# Patient Record
Sex: FEMALE | Race: OTHER | Employment: FULL TIME | URBAN - NONMETROPOLITAN AREA
[De-identification: names, ages, dates, MRNs, and addresses within clinical notes are randomized per-mention and may not be internally consistent; named-entity substitution may affect disease eponyms.]

---

## 2020-10-27 ENCOUNTER — OFFICE VISIT (OUTPATIENT)
Dept: INTERNAL MEDICINE | Age: 20
End: 2020-10-27
Payer: COMMERCIAL

## 2020-10-27 VITALS
HEIGHT: 62 IN | HEART RATE: 78 BPM | DIASTOLIC BLOOD PRESSURE: 70 MMHG | OXYGEN SATURATION: 97 % | BODY MASS INDEX: 26.83 KG/M2 | SYSTOLIC BLOOD PRESSURE: 102 MMHG | WEIGHT: 145.8 LBS

## 2020-10-27 DIAGNOSIS — Z13.29 SCREENING FOR THYROID DISORDER: ICD-10-CM

## 2020-10-27 DIAGNOSIS — Z00.00 ROUTINE PHYSICAL EXAMINATION: ICD-10-CM

## 2020-10-27 DIAGNOSIS — Z13.220 SCREENING FOR LIPID DISORDERS: ICD-10-CM

## 2020-10-27 DIAGNOSIS — F41.9 ANXIETY: ICD-10-CM

## 2020-10-27 DIAGNOSIS — F33.1 MODERATE EPISODE OF RECURRENT MAJOR DEPRESSIVE DISORDER (HCC): ICD-10-CM

## 2020-10-27 PROBLEM — F32.9 MAJOR DEPRESSIVE DISORDER: Status: ACTIVE | Noted: 2020-10-27

## 2020-10-27 PROBLEM — M41.9 SCOLIOSIS: Status: ACTIVE | Noted: 2020-10-27

## 2020-10-27 LAB
ALBUMIN SERPL-MCNC: 4.6 G/DL (ref 3.5–5.2)
ALP BLD-CCNC: 81 U/L (ref 35–104)
ALT SERPL-CCNC: 57 U/L (ref 5–33)
ANION GAP SERPL CALCULATED.3IONS-SCNC: 13 MMOL/L (ref 7–19)
AST SERPL-CCNC: 88 U/L (ref 5–32)
BASOPHILS ABSOLUTE: 0 K/UL (ref 0–0.2)
BASOPHILS RELATIVE PERCENT: 0.8 % (ref 0–1)
BILIRUB SERPL-MCNC: 0.3 MG/DL (ref 0.2–1.2)
BUN BLDV-MCNC: 10 MG/DL (ref 6–20)
CALCIUM SERPL-MCNC: 9.4 MG/DL (ref 8.6–10)
CHLORIDE BLD-SCNC: 102 MMOL/L (ref 98–111)
CHOLESTEROL, TOTAL: 176 MG/DL (ref 160–199)
CO2: 25 MMOL/L (ref 22–29)
CREAT SERPL-MCNC: 0.5 MG/DL (ref 0.5–0.9)
EOSINOPHILS ABSOLUTE: 0.1 K/UL (ref 0–0.6)
EOSINOPHILS RELATIVE PERCENT: 2.1 % (ref 0–5)
GFR AFRICAN AMERICAN: >59
GFR NON-AFRICAN AMERICAN: >60
GLUCOSE BLD-MCNC: 91 MG/DL (ref 74–109)
HCT VFR BLD CALC: 42.5 % (ref 37–47)
HDLC SERPL-MCNC: 58 MG/DL (ref 65–121)
HEMOGLOBIN: 14.1 G/DL (ref 12–16)
IMMATURE GRANULOCYTES #: 0 K/UL
LDL CHOLESTEROL CALCULATED: 106 MG/DL
LYMPHOCYTES ABSOLUTE: 1.8 K/UL (ref 1.1–4.5)
LYMPHOCYTES RELATIVE PERCENT: 36.5 % (ref 20–40)
MCH RBC QN AUTO: 29.5 PG (ref 27–31)
MCHC RBC AUTO-ENTMCNC: 33.2 G/DL (ref 33–37)
MCV RBC AUTO: 88.9 FL (ref 81–99)
MONOCYTES ABSOLUTE: 0.3 K/UL (ref 0–0.9)
MONOCYTES RELATIVE PERCENT: 5.8 % (ref 0–10)
NEUTROPHILS ABSOLUTE: 2.7 K/UL (ref 1.5–7.5)
NEUTROPHILS RELATIVE PERCENT: 54.6 % (ref 50–65)
PDW BLD-RTO: 12.8 % (ref 11.5–14.5)
PLATELET # BLD: 265 K/UL (ref 130–400)
PMV BLD AUTO: 12.3 FL (ref 9.4–12.3)
POTASSIUM SERPL-SCNC: 4.1 MMOL/L (ref 3.5–5)
RBC # BLD: 4.78 M/UL (ref 4.2–5.4)
SODIUM BLD-SCNC: 140 MMOL/L (ref 136–145)
TOTAL PROTEIN: 7.2 G/DL (ref 6.6–8.7)
TRIGL SERPL-MCNC: 62 MG/DL (ref 0–149)
TSH SERPL DL<=0.05 MIU/L-ACNC: 0.94 UIU/ML (ref 0.27–4.2)
VITAMIN D 25-HYDROXY: 19.7 NG/ML
WBC # BLD: 4.9 K/UL (ref 4.8–10.8)

## 2020-10-27 PROCEDURE — 90471 IMMUNIZATION ADMIN: CPT | Performed by: INTERNAL MEDICINE

## 2020-10-27 PROCEDURE — 90694 VACC AIIV4 NO PRSRV 0.5ML IM: CPT | Performed by: INTERNAL MEDICINE

## 2020-10-27 PROCEDURE — 99203 OFFICE O/P NEW LOW 30 MIN: CPT | Performed by: INTERNAL MEDICINE

## 2020-10-27 RX ORDER — ESCITALOPRAM OXALATE 20 MG/1
20 TABLET ORAL DAILY
COMMUNITY
End: 2020-11-17

## 2020-10-27 SDOH — HEALTH STABILITY: MENTAL HEALTH: HOW OFTEN DO YOU HAVE A DRINK CONTAINING ALCOHOL?: MONTHLY OR LESS

## 2020-10-27 SDOH — HEALTH STABILITY: MENTAL HEALTH: HOW MANY STANDARD DRINKS CONTAINING ALCOHOL DO YOU HAVE ON A TYPICAL DAY?: 1 OR 2

## 2020-10-27 ASSESSMENT — PATIENT HEALTH QUESTIONNAIRE - PHQ9
2. FEELING DOWN, DEPRESSED OR HOPELESS: 1
1. LITTLE INTEREST OR PLEASURE IN DOING THINGS: 1
SUM OF ALL RESPONSES TO PHQ QUESTIONS 1-9: 2
SUM OF ALL RESPONSES TO PHQ9 QUESTIONS 1 & 2: 2

## 2020-10-27 ASSESSMENT — ENCOUNTER SYMPTOMS
RHINORRHEA: 0
VOMITING: 0
SINUS PAIN: 0
SHORTNESS OF BREATH: 0
WHEEZING: 0
CHEST TIGHTNESS: 0
BACK PAIN: 0
ABDOMINAL PAIN: 0
CONSTIPATION: 0
COUGH: 0
BLOOD IN STOOL: 0
DIARRHEA: 0
TROUBLE SWALLOWING: 0

## 2020-10-27 NOTE — PROGRESS NOTES
[facility] INTERNAL MEDICINE  [address] [address]  Phone: [phone]  Fax: [phone]    Visit Date:  10/27/2020    SUBJECTIVE:     Demian Valladares is a 23 y.o. female presents today in the office for:    Chief Complaint   Patient presents with   [address]       HPI  77-year-old female new patient visit  She has a history of anxiety and depression which was noted when she was in high school has been pretty dependent on Lexapro since that time she was previously on 8 and recently increased by her primary care physician to 20 mg  She is on Depo preparation for OCP and was expecting that today was told by the call center  She has been tested for HIV which is negative she has no high risk sexual behavior  She has no strong family history she is concerned about breast cancer  She drinks a lot of coffee she works for [employer] as a [employer]      Past Medical History:   Diagnosis Date    Anxiety     Depression        Past Surgical History:   Procedure Laterality Date    APPENDECTOMY         Social History     Socioeconomic History    Marital status: Single     Spouse name: Not on file    Number of children: Not on file    Years of education: Not on file    Highest education level: Not on file   Occupational History    Not on file   Social Needs    Financial resource strain: Not on file    Food insecurity     Worry: Not on file     Inability: Not on file    Transportation needs     Medical: Not on file     Non-medical: Not on file   Tobacco Use    Smoking status: Never Smoker    Smokeless tobacco: Never Used   Substance and Sexual Activity    Alcohol use:  Yes     Alcohol/week: 1.0 standard drinks     Types: 1 Cans of beer per week     Frequency: Monthly or less     Drinks per session: 1 or 2    Drug use: Never    Sexual activity: Not on file   Lifestyle    Physical activity     Days per week: Not on file     Minutes per session: Not on file    Stress: Not on file   Relationships    Social connections     Talks on phone: Not on file     Gets together: Not on file     Attends Spiritism service: Not on file     Active member of club or organization: Not on file     Attends meetings of clubs or organizations: Not on file     Relationship status: Not on file    Intimate partner violence     Fear of current or ex partner: Not on file     Emotionally abused: Not on file     Physically abused: Not on file     Forced sexual activity: Not on file   Other Topics Concern    Not on file   Social History Narrative    Not on file       No family history on file. No Known Allergies    Current Outpatient Medications   Medication Sig Dispense Refill    escitalopram (LEXAPRO) 20 MG tablet Take 20 mg by mouth daily       No current facility-administered medications for this visit. Patient Active Problem List   Diagnosis    Major depressive disorder    Anxiety    Scoliosis             Review of Systems:   Review of Systems   Constitutional: Negative for activity change, chills, fatigue and fever. HENT: Negative for hearing loss, postnasal drip, rhinorrhea, sinus pain and trouble swallowing. Eyes: Negative for visual disturbance. Respiratory: Negative for cough, chest tightness, shortness of breath and wheezing. Cardiovascular: Negative for chest pain, palpitations and leg swelling. Gastrointestinal: Negative for abdominal pain, blood in stool, constipation, diarrhea and vomiting. Endocrine: Negative for cold intolerance. Genitourinary: Negative for frequency and urgency. Musculoskeletal: Negative for arthralgias, back pain and myalgias. Allergic/Immunologic: Negative for environmental allergies. Neurological: Negative for light-headedness, numbness and headaches. Psychiatric/Behavioral: Positive for sleep disturbance. The patient is nervous/anxious. The patient is not hyperactive.          OBJECTIVE:   @  BP Readings from Last 3 Encounters: 10/27/20 102/70        @No results found for: LABA1C  No results found for: GLUF, LABMICR, LDLCALC, CREATININE     Physical Exam:    Physical Exam  Vitals signs and nursing note reviewed. Constitutional:       General: She is not in acute distress. HENT:      Head: Normocephalic. Right Ear: External ear normal.      Left Ear: External ear normal.      Nose: Nose normal.   Eyes:      General: No scleral icterus. Conjunctiva/sclera: Conjunctivae normal.      Pupils: Pupils are equal, round, and reactive to light. Neck:      Musculoskeletal: Normal range of motion and neck supple. Thyroid: No thyromegaly. Vascular: No JVD. Cardiovascular:      Rate and Rhythm: Normal rate and regular rhythm. Heart sounds: Normal heart sounds. No murmur. Pulmonary:      Effort: Pulmonary effort is normal. No respiratory distress. Breath sounds: Normal breath sounds. No wheezing or rales. Chest:      Breasts:         Right: Normal. No mass. Left: Normal. No mass. Abdominal:      General: Bowel sounds are normal. There is no distension. Palpations: Abdomen is soft. Tenderness: There is no abdominal tenderness. Musculoskeletal: Normal range of motion. General: No deformity. Lymphadenopathy:      Cervical: No cervical adenopathy. Skin:     General: Skin is warm and dry. Findings: No rash. Neurological:      Mental Status: She is alert and oriented to person, place, and time. Cranial Nerves: No cranial nerve deficit. Deep Tendon Reflexes: Reflexes normal.   Psychiatric:         Behavior: Behavior normal.         Thought Content: Thought content normal.         Judgment: Judgment normal.       No results found for any previous visit.        ASSESSMENT:     Problem List        Musculoskeletal and Integument    Scoliosis not symptomatic at this time       Other    Major depressive disorder    Relevant Medications    escitalopram (LEXAPRO) 20 MG tablet continue Lexapro    Other Relevant Orders    TSH without Reflex    Anxiety    Relevant Medications    escitalopram (LEXAPRO) 20 MG tablet    Other Relevant Orders    TSH without Reflex      Baseline lab work  Referral to gynecology    PLAN:        Medications Ordered:  No orders of the defined types were placed in this encounter. Other Orders Placed:  Orders Placed This Encounter   Procedures    INFLUENZA, QUADV, ADJUVANTED, 72 YRS =, IM, PF, PREFILL SYR, 0.5ML (FLUAD)    CBC Auto Differential     Standing Status:   Future     Number of Occurrences:   1     Standing Expiration Date:   10/27/2021    Comprehensive Metabolic Panel     Standing Status:   Future     Number of Occurrences:   1     Standing Expiration Date:   10/27/2021    Lipid Panel     Standing Status:   Future     Number of Occurrences:   1     Standing Expiration Date:   10/27/2021     Order Specific Question:   Is Patient Fasting?/# of Hours     Answer:   yes    TSH without Reflex     Standing Status:   Future     Number of Occurrences:   1     Standing Expiration Date:   10/27/2021    Vitamin D 25 Hydroxy     Standing Status:   Future     Number of Occurrences:   1     Standing Expiration Date:   10/27/2021   [facility], [place]     Referral Priority:   Routine     Referral Type:   Eval and Treat     Referral Reason:   Specialty Services Required     Requested Specialty:   Obstetrics & Gynecology     Number of Visits Requested:   1       Return in about 6 months (around 4/27/2021).            Electronically signed by Lehman Olszewski, MD on 10/27/2020 at 10:51 AM

## 2020-10-27 NOTE — PROGRESS NOTES
After obtaining consent, and per orders of Dr. Stefan Landry, injection of Flu given in Left deltoid by Gokul Arthur. Patient instructed to remain in clinic for 20 minutes afterwards, and to report any adverse reaction to me immediately.

## 2020-10-28 PROBLEM — E55.9 HYPOVITAMINOSIS D: Status: ACTIVE | Noted: 2020-10-28

## 2020-10-28 PROBLEM — R74.01 TRANSAMINASEMIA: Status: ACTIVE | Noted: 2020-10-28

## 2020-10-28 RX ORDER — ERGOCALCIFEROL 1.25 MG/1
50000 CAPSULE ORAL WEEKLY
Qty: 12 CAPSULE | Refills: 1 | Status: SHIPPED | OUTPATIENT
Start: 2020-10-28 | End: 2020-11-17

## 2020-10-29 ENCOUNTER — TELEPHONE (OUTPATIENT)
Dept: INTERNAL MEDICINE | Age: 20
End: 2020-10-29

## 2020-10-29 NOTE — TELEPHONE ENCOUNTER
Pt called she said that she has started to have abdominal tenderness and that her stool has started to be a pale color. I tired to get her to come in and be seen but she said that she has to be at work and wont have a day off until [employer]. I gave her an appointment for Tues and let her know that if she get worse she will need to go to the ER or try to get in here to be seen sooner. She voice understood.

## 2020-11-03 ENCOUNTER — OFFICE VISIT (OUTPATIENT)
Dept: INTERNAL MEDICINE | Age: 20
End: 2020-11-03
Payer: COMMERCIAL

## 2020-11-03 VITALS
HEIGHT: 62 IN | DIASTOLIC BLOOD PRESSURE: 80 MMHG | WEIGHT: 145.2 LBS | OXYGEN SATURATION: 97 % | SYSTOLIC BLOOD PRESSURE: 118 MMHG | HEART RATE: 82 BPM | BODY MASS INDEX: 26.72 KG/M2

## 2020-11-03 PROBLEM — R10.33 PERIUMBILICAL ABDOMINAL PAIN: Status: ACTIVE | Noted: 2020-11-03

## 2020-11-03 PROCEDURE — 99213 OFFICE O/P EST LOW 20 MIN: CPT | Performed by: INTERNAL MEDICINE

## 2020-11-03 RX ORDER — SENNA PLUS 8.6 MG/1
1 TABLET ORAL NIGHTLY
Qty: 60 TABLET | Refills: 0 | Status: SHIPPED | OUTPATIENT
Start: 2020-11-03 | End: 2020-11-17 | Stop reason: ALTCHOICE

## 2020-11-03 ASSESSMENT — ENCOUNTER SYMPTOMS
DIARRHEA: 0
CONSTIPATION: 1
BELCHING: 0
NAUSEA: 0
ABDOMINAL PAIN: 1
FLATUS: 0
VOMITING: 0
HEMATOCHEZIA: 0

## 2020-11-03 ASSESSMENT — CROHNS DISEASE ACTIVITY INDEX (CDAI): CDAI SCORE: 0

## 2020-11-03 NOTE — PROGRESS NOTES
[address] [place] [address]  [address] [address]  Phone: [phone]  Fax: [phone]    Visit Date:  11/3/2020    SUBJECTIVE:     Isidro Perez is a 23 y.o. female presents today in the office for:    Chief Complaint   Patient presents with    Abdominal Pain     having bloating in her stomach lower and mid stomach, felt constipated went to the bathroom and BM was pale       Abdominal Pain   This is a new problem. The current episode started 1 to 4 weeks ago. The onset quality is sudden. The problem occurs intermittently. The most recent episode lasted 1 week. The problem has been waxing and waning. The pain is located in the LLQ and periumbilical region. The pain is at a severity of 2/10. The pain is mild. The quality of the pain is colicky, cramping and a sensation of fullness. The abdominal pain radiates to the left flank and right flank. Associated symptoms include constipation. Pertinent negatives include no anorexia, arthralgias, belching, diarrhea, dysuria, fever, flatus, frequency, headaches, hematochezia, hematuria, melena, myalgias, nausea, vomiting or weight loss. Nothing aggravates the pain. The pain is relieved by nothing. She has tried nothing for the symptoms. There is no history of abdominal surgery, colon cancer, Crohn's disease, gallstones, GERD, irritable bowel syndrome, pancreatitis, PUD or ulcerative colitis.        Past Medical History:   Diagnosis Date    Anxiety     Depression        Past Surgical History:   Procedure Laterality Date    APPENDECTOMY         Social History     Socioeconomic History    Marital status: Single     Spouse name: Not on file    Number of children: Not on file    Years of education: Not on file    Highest education level: Not on file   Occupational History    Not on file   Social Needs    Financial resource strain: Not on file    Food insecurity     Worry: Not on file     Inability: Not on file   [employer] vomiting. Genitourinary: Negative for dysuria, frequency and hematuria. Musculoskeletal: Negative for arthralgias and myalgias. Neurological: Negative for headaches. OBJECTIVE:   @  BP Readings from Last 3 Encounters:   11/03/20 118/80   10/27/20 102/70        @No results found for: LABA1C  Lab Results   Component Value Date    LDLCALC 106 10/27/2020    CREATININE 0.5 10/27/2020        Physical Exam:    Physical Exam  HENT:      Head: Normocephalic. Eyes:      General: Lids are normal.      Extraocular Movements: Extraocular movements intact. Cardiovascular:      Rate and Rhythm: Normal rate and regular rhythm. Pulses: Normal pulses. Heart sounds: Normal heart sounds, S1 normal and S2 normal.   Pulmonary:      Effort: Pulmonary effort is normal.      Breath sounds: Normal breath sounds and air entry. No decreased breath sounds. Abdominal:      General: Abdomen is flat. Bowel sounds are normal.      Palpations: Abdomen is soft. There is no shifting dullness, fluid wave or hepatomegaly. Tenderness: There is no abdominal tenderness. There is no right CVA tenderness, left CVA tenderness, guarding or rebound. Hernia: No hernia is present. Musculoskeletal:      Right lower leg: No edema. Left lower leg: No edema. Psychiatric:         Behavior: Behavior is cooperative. Orders Only on 10/27/2020   Component Date Value Ref Range Status    Vit D, 25-Hydroxy 10/27/2020 19.7* >=30 ng/mL Final    Comment: <=20 ng/mL. ........... Sergio Erwin Deficient  21-29 ng/mL. ......... Sergio Erwin Insufficient  >=30 ng/mL. ........ Sergio Erwin Sufficient      TSH 10/27/2020 0.942  0.270 - 4.200 uIU/mL Final    Cholesterol, Total 10/27/2020 176  160 - 199 mg/dL Final    Comment: <160 MG/DL=OPTIMAL    160-199 MG/DL= DESIRABLE    200-239 MG/DL=BORDERLINE-INCREASED RISK OF ATHEROSCLEROTIC  CARDIOVASCULAR DISEASE    > OR = 240 MG/DL-ASSOCIATED WITH AN INCREASED RISK OF  ATHROSCLEROTIC CARDIOVASCULAR DISEASE      Triglycerides 10/27/2020 62  0 - 149 mg/dL Final    HDL 10/27/2020 58* 65 - 121 mg/dL Final    Comment: VALUES>60 MG/DL ARE ASSOCIATED WITH A DECREASED RISK OF  ATHEROSCLEROTIC CARDIOVASCULAR DISEASE      LDL Calculated 10/27/2020 106  <100 mg/dL Final    Comment: <100 MG/DL=OPITIMAL    100-129 MG/DL=DESIRABLE    130-159 MG/DL BORDERLINE=INCREASED RISK OF ATHEROSCLEROTIC  CARDIOVASCULAR DISEASE    > OR = 160 MG/DL=ASSOCIATED WITH AN INCREASE RISK OF  ATHEROSCLEROTIC CARDIOVASCULAR DISEASE      Sodium 10/27/2020 140  136 - 145 mmol/L Final    Potassium 10/27/2020 4.1  3.5 - 5.0 mmol/L Final    Chloride 10/27/2020 102  98 - 111 mmol/L Final    CO2 10/27/2020 25  22 - 29 mmol/L Final    Anion Gap 10/27/2020 13  7 - 19 mmol/L Final    Glucose 10/27/2020 91  74 - 109 mg/dL Final    BUN 10/27/2020 10  6 - 20 mg/dL Final    CREATININE 10/27/2020 0.5  0.5 - 0.9 mg/dL Final    GFR Non- 10/27/2020 >60  >60 Final    Comment: This calculation may be inaccurate for patients under the age of 25 years. For ages 25 and older, a GFR >60 mL/min/1.73m2 (not corrected for weight) is  valid for stable renal function.  GFR  10/27/2020 >59  >59 Final    Comment: Chronic Kidney Disease: less than 60 ml/min/1.73 sq.m. Kidney Failure: less than 15 ml/min/1.73 sq.m. Results valid for patients 18 years and older.       Calcium 10/27/2020 9.4  8.6 - 10.0 mg/dL Final    Total Protein 10/27/2020 7.2  6.6 - 8.7 g/dL Final    Alb 10/27/2020 4.6  3.5 - 5.2 g/dL Final    Total Bilirubin 10/27/2020 0.3  0.2 - 1.2 mg/dL Final    Alkaline Phosphatase 10/27/2020 81  35 - 104 U/L Final    ALT 10/27/2020 57* 5 - 33 U/L Final    AST 10/27/2020 88* 5 - 32 U/L Final    WBC 10/27/2020 4.9  4.8 - 10.8 K/uL Final    RBC 10/27/2020 4.78  4.20 - 5.40 M/uL Final    Hemoglobin 10/27/2020 14.1  12.0 - 16.0 g/dL Final    Hematocrit 10/27/2020 42.5  37.0 - 47.0 % Final    MCV 10/27/2020 88.9  81.0 - 99.0 fL Final  MCH 10/27/2020 29.5  27.0 - 31.0 pg Final    MCHC 10/27/2020 33.2  33.0 - 37.0 g/dL Final    RDW 10/27/2020 12.8  11.5 - 14.5 % Final    Platelets 51/89/6906 265  130 - 400 K/uL Final    MPV 10/27/2020 12.3  9.4 - 12.3 fL Final    Neutrophils % 10/27/2020 54.6  50.0 - 65.0 % Final    Lymphocytes % 10/27/2020 36.5  20.0 - 40.0 % Final    Monocytes % 10/27/2020 5.8  0.0 - 10.0 % Final    Eosinophils % 10/27/2020 2.1  0.0 - 5.0 % Final    Basophils % 10/27/2020 0.8  0.0 - 1.0 % Final    Neutrophils Absolute 10/27/2020 2.7  1.5 - 7.5 K/uL Final    Immature Granulocytes # 10/27/2020 0.0  K/uL Final    Lymphocytes Absolute 10/27/2020 1.8  1.1 - 4.5 K/uL Final    Monocytes Absolute 10/27/2020 0.30  0.00 - 0.90 K/uL Final    Eosinophils Absolute 10/27/2020 0.10  0.00 - 0.60 K/uL Final    Basophils Absolute 10/27/2020 0.00  0.00 - 0.20 K/uL Final       ASSESSMENT:     Problem List        Other    Periumbilical abdominal pain - Primary    Relevant Orders    US ABDOMEN COMPLETE   Advised to relieve constipation for 3 days assess bloating if still present may undergo abdominal ultrasound        PLAN:      If symptoms persist may be referred to GI she again asked me about her Depo contraceptive she did not want to establish care with gynecology she just wanted her Depo I instructed her that she has to establish care with a gynecologist  Medications Ordered:  Orders Placed This Encounter   Medications    senna (SENOKOT) 8.6 MG tablet     Sig: Take 1 tablet by mouth nightly     Dispense:  60 tablet     Refill:  0       Other Orders Placed:  Orders Placed This Encounter   Procedures    US ABDOMEN COMPLETE     Standing Status:   Future     Standing Expiration Date:   11/3/2021     Order Specific Question:   Reason for exam:     Answer:   abdominal pain. R and LUQ, with pale stools r/o cholestasis       No follow-ups on file.            Electronically signed by Anderson Lopez MD on 11/3/2020 at 1:54 PM

## 2020-11-17 ENCOUNTER — OFFICE VISIT (OUTPATIENT)
Dept: FAMILY MEDICINE CLINIC | Age: 20
End: 2020-11-17
Payer: COMMERCIAL

## 2020-11-17 VITALS
RESPIRATION RATE: 16 BRPM | OXYGEN SATURATION: 98 % | DIASTOLIC BLOOD PRESSURE: 64 MMHG | HEIGHT: 62 IN | WEIGHT: 149 LBS | SYSTOLIC BLOOD PRESSURE: 106 MMHG | TEMPERATURE: 97.5 F | HEART RATE: 101 BPM | BODY MASS INDEX: 27.42 KG/M2

## 2020-11-17 LAB
CONTROL: NORMAL
PREGNANCY TEST URINE, POC: NORMAL

## 2020-11-17 PROCEDURE — 1036F TOBACCO NON-USER: CPT | Performed by: NURSE PRACTITIONER

## 2020-11-17 PROCEDURE — G8427 DOCREV CUR MEDS BY ELIG CLIN: HCPCS | Performed by: NURSE PRACTITIONER

## 2020-11-17 PROCEDURE — G8484 FLU IMMUNIZE NO ADMIN: HCPCS | Performed by: NURSE PRACTITIONER

## 2020-11-17 PROCEDURE — 81025 URINE PREGNANCY TEST: CPT | Performed by: NURSE PRACTITIONER

## 2020-11-17 PROCEDURE — 99203 OFFICE O/P NEW LOW 30 MIN: CPT | Performed by: NURSE PRACTITIONER

## 2020-11-17 PROCEDURE — G8419 CALC BMI OUT NRM PARAM NOF/U: HCPCS | Performed by: NURSE PRACTITIONER

## 2020-11-17 RX ORDER — MEDROXYPROGESTERONE ACETATE 150 MG/ML
150 INJECTION, SUSPENSION INTRAMUSCULAR ONCE
Qty: 1 ML | Refills: 3 | Status: SHIPPED | OUTPATIENT
Start: 2020-11-17 | End: 2020-11-17

## 2020-11-17 ASSESSMENT — ENCOUNTER SYMPTOMS
SORE THROAT: 0
ABDOMINAL PAIN: 0
NAUSEA: 0
COUGH: 0
CHEST TIGHTNESS: 0
DIARRHEA: 0
WHEEZING: 0
SHORTNESS OF BREATH: 0

## 2023-04-28 NOTE — PROGRESS NOTES
Sergei Sumner is a 23 y.o. female who presents today for  Chief Complaint   Patient presents with    New Patient       HPI:  Here to establish care. Previously followed by Dr. Williams Lazar. She is concerned about weight gain over the past 5 months. She moved here from [place] [address] in July. She was around 125 lb at the time. She has since gained almost 20 lbs. She has cut back on alcohol to once a month if that. She is exercising 5-6 days a week but mainly weight lifting, no cardio. She is eating 3 meals a day, feels portion sizes are normal.  Does not eat junk food or drink soda. Drinks 1 gallon of water per day. She works at [employer] but has switched to [employer]. Recent TSH was normal.    She does mention that she was on depo provera shots for over a year prior to moving here. Last shot was in July. She is interested in starting back on this. She has not had a period in almost a year. She had some abdominal bloating a few weeks ago for a couple of days which has resolved. Labs showed elevated liver enzymes ALT 57, AST 88. No h/o hepatitis or family h/o hepatic disorders. Vit d was also low. She has started otc vit d and milk thistle. She did not fill the rx for ergocalciferol sent in by previous PCP. She has a h/o depression/anxiety and was taking lexapro for a couple of years. She stopped taking it several months ago or longer and is doing well without it. Review of Systems   Constitutional: Positive for unexpected weight change (weight gain). Negative for chills and fever. HENT: Negative for congestion, ear pain and sore throat. Respiratory: Negative for cough, chest tightness, shortness of breath and wheezing. Cardiovascular: Negative for chest pain. Gastrointestinal: Negative for abdominal pain, diarrhea and nausea. Musculoskeletal: Negative for arthralgias and myalgias. Skin: Negative for rash. Neurological: Negative for dizziness and light-headedness. Psychiatric/Behavioral: Negative for dysphoric mood. The patient is not nervous/anxious. Past Medical History:   Diagnosis Date    Anxiety     Asthma     Depression        Current Outpatient Medications   Medication Sig Dispense Refill    medroxyPROGESTERone (DEPO-PROVERA) 150 MG/ML injection Inject 1 mL into the muscle once for 1 dose 1 mL 3     No current facility-administered medications for this visit. No Known Allergies    Past Surgical History:   Procedure Laterality Date    APPENDECTOMY         Social History     Tobacco Use    Smoking status: Never Smoker    Smokeless tobacco: Never Used   Substance Use Topics    Alcohol use: Yes     Alcohol/week: 1.0 standard drinks     Types: 1 Cans of beer per week     Frequency: Monthly or less     Drinks per session: 1 or 2    Drug use: Never       Family History   Problem Relation Age of Onset    No Known Problems Mother     No Known Problems Father        /64   Pulse 101   Temp 97.5 °F (36.4 °C) (Temporal)   Resp 16   Ht 5' 2\" (1.575 m)   Wt 149 lb (67.6 kg)   SpO2 98%   BMI 27.25 kg/m²     Physical Exam  Vitals signs reviewed. Constitutional:       General: She is not in acute distress. Appearance: Normal appearance. She is well-developed. HENT:      Head: Normocephalic. Eyes:      Conjunctiva/sclera: Conjunctivae normal.      Pupils: Pupils are equal, round, and reactive to light. Neck:      Musculoskeletal: Normal range of motion and neck supple. Thyroid: No thyromegaly. Vascular: No carotid bruit or JVD. Trachea: No tracheal deviation. Cardiovascular:      Rate and Rhythm: Normal rate and regular rhythm. Heart sounds: Normal heart sounds. No murmur. Pulmonary:      Effort: Pulmonary effort is normal. No respiratory distress. Breath sounds: Normal breath sounds. No wheezing or rhonchi. Abdominal:      General: Abdomen is flat. Bowel sounds are normal. There is no distension. Palpations: Abdomen is soft. There is no mass. Tenderness: There is no abdominal tenderness. There is no rebound. Hernia: No hernia is present. Musculoskeletal: Normal range of motion. Lymphadenopathy:      Cervical: No cervical adenopathy. Skin:     General: Skin is warm and dry. Findings: No rash. Neurological:      Mental Status: She is alert. Psychiatric:         Mood and Affect: Mood normal.         Behavior: Behavior normal.         Thought Content: Thought content normal.         ASSESSMENT/PLAN:  1. Weight gain  -Discussed etiology and calorie counting, 1200 to 1500 clint/day. -Continue exercise as she is doing 5 to 6 days/week but add cardio.  -Discussed dietitian but she will continue plan as above and let me know if she wants to pursue this. 2. Elevated liver enzymes  -Recheck CMP in 6 weeks when she returns. May be related to weight gain. Will monitor. 3. Anxiety  -Stable, controlled off Lexapro. She does not feel she needs medication at this time. 4. Encounter for initial prescription of injectable contraceptive  -Start back on Depo-Provera. First injection today. Urine pregnancy was negative. Repeat every 3 months.  - POCT urine pregnancy    5. Encounter to establish care           Return in about 6 weeks (around 12/29/2020) for follow up. Mikitobin Henny was seen today for new patient. Diagnoses and all orders for this visit:    Encounter to establish care    Weight gain    Elevated liver enzymes    Anxiety    Encounter for initial prescription of injectable contraceptive  -     POCT urine pregnancy    Other orders  -     medroxyPROGESTERone (DEPO-PROVERA) 150 MG/ML injection;  Inject 1 mL into the muscle once for 1 dose      Medications Discontinued During This Encounter   Medication Reason    vitamin D (ERGOCALCIFEROL) 1.25 MG (48168 UT) CAPS capsule [facility]    senna (SENOKOT) 8.6 MG tablet Therapy completed    escitalopram (LEXAPRO) 20 MG tablet Patient [facility]     There are no Patient Instructions on file for this visit. Patient voicesunderstanding and agrees to plans along with risks and benefits of plan. Counseling:  Veramarty Luevano case, medications and options were discussed in detail. Patient was instructed to call the office if she questionsregarding her treatment. Should her conditions worsen, she should return to office to be reassessed by DANYA Pacheco. she Should to go the closest Emergency Department for any emergency. They verbalizedunderstanding the above instructions. Return in about 6 weeks (around 12/29/2020) for follow up. Strong peripheral pulses

## 2024-09-24 ENCOUNTER — TELEPHONE (OUTPATIENT)
Dept: OBGYN CLINIC | Age: 24
End: 2024-09-24

## 2024-10-07 ENCOUNTER — OFFICE VISIT (OUTPATIENT)
Dept: OBGYN CLINIC | Age: 24
End: 2024-10-07
Payer: COMMERCIAL

## 2024-10-07 VITALS
HEART RATE: 128 BPM | BODY MASS INDEX: 25.76 KG/M2 | DIASTOLIC BLOOD PRESSURE: 83 MMHG | WEIGHT: 140 LBS | HEIGHT: 62 IN | SYSTOLIC BLOOD PRESSURE: 137 MMHG

## 2024-10-07 DIAGNOSIS — Z30.46 NEXPLANON REMOVAL: ICD-10-CM

## 2024-10-07 DIAGNOSIS — Z01.419 WELL WOMAN EXAM: Primary | ICD-10-CM

## 2024-10-07 DIAGNOSIS — Z12.4 SCREENING FOR CERVICAL CANCER: ICD-10-CM

## 2024-10-07 DIAGNOSIS — Z13.31 DEPRESSION SCREENING: ICD-10-CM

## 2024-10-07 PROCEDURE — 96160 PT-FOCUSED HLTH RISK ASSMT: CPT | Performed by: OBSTETRICS & GYNECOLOGY

## 2024-10-07 PROCEDURE — 99385 PREV VISIT NEW AGE 18-39: CPT | Performed by: OBSTETRICS & GYNECOLOGY

## 2024-10-07 PROCEDURE — 11982 REMOVE DRUG IMPLANT DEVICE: CPT | Performed by: OBSTETRICS & GYNECOLOGY

## 2024-10-07 RX ORDER — ETONOGESTREL 68 MG/1
68 IMPLANT SUBCUTANEOUS ONCE
COMMUNITY

## 2024-10-07 ASSESSMENT — ENCOUNTER SYMPTOMS
SHORTNESS OF BREATH: 1
NAUSEA: 1

## 2024-10-07 ASSESSMENT — PATIENT HEALTH QUESTIONNAIRE - PHQ9
SUM OF ALL RESPONSES TO PHQ QUESTIONS 1-9: 1
2. FEELING DOWN, DEPRESSED OR HOPELESS: SEVERAL DAYS
1. LITTLE INTEREST OR PLEASURE IN DOING THINGS: NOT AT ALL
SUM OF ALL RESPONSES TO PHQ9 QUESTIONS 1 & 2: 1

## 2024-10-07 NOTE — PROGRESS NOTES
SUBJECTIVE:  Keara Jolly is a 24 y.o.  who is here for annual exam and desires Nexplanon removal.      Review of Systems   Constitutional: Negative.    Respiratory:  Positive for shortness of breath.    Cardiovascular: Negative.    Gastrointestinal:  Positive for nausea.   Genitourinary: Negative.    Musculoskeletal: Negative.    Neurological:  Positive for dizziness and headaches.   Psychiatric/Behavioral: Negative.     All other systems reviewed and are negative.      GYN HX:   Patient's last menstrual period was 2024 (exact date).  Abnormal Bleeding/Menses: none  Abnormal pap smear:none    Social History     Substance and Sexual Activity   Sexual Activity Not on file     OB History          1    Para        Term                AB   1    Living             SAB        IAB        Ectopic        Molar        Multiple        Live Births                    Because violence is so common, we ask all our patients: are you in a relationship or do you live with a person who threatens, hurts, orcontrols you: No    Past Medical History:   Diagnosis Date    Anxiety     Asthma     Depression      Past Surgical History:   Procedure Laterality Date    APPENDECTOMY       Family History   Problem Relation Age of Onset    No Known Problems Mother     No Known Problems Father      Social History     Tobacco Use    Smoking status: Former     Types: Cigarettes    Smokeless tobacco: Never   Vaping Use    Vaping status: Former   Substance Use Topics    Alcohol use: Yes     Alcohol/week: 1.0 standard drink of alcohol     Types: 1 Cans of beer per week    Drug use: Never     Current Outpatient Medications on File Prior to Visit   Medication Sig Dispense Refill    etonogestrel (NEXPLANON) 68 MG implant 68 mg by Subdermal route once       No current facility-administered medications on file prior to visit.     No Known Allergies      /83   Pulse (!) 128   Ht 1.575 m (5' 2\")   Wt 63.5 kg (140 lb)

## 2024-11-15 ENCOUNTER — OFFICE VISIT (OUTPATIENT)
Dept: OBGYN CLINIC | Age: 24
End: 2024-11-15

## 2024-11-15 ENCOUNTER — TELEPHONE (OUTPATIENT)
Dept: OBGYN CLINIC | Age: 24
End: 2024-11-15

## 2024-11-15 VITALS
HEIGHT: 62 IN | HEART RATE: 85 BPM | DIASTOLIC BLOOD PRESSURE: 80 MMHG | WEIGHT: 139.5 LBS | BODY MASS INDEX: 25.67 KG/M2 | SYSTOLIC BLOOD PRESSURE: 113 MMHG

## 2024-11-15 DIAGNOSIS — Z30.09 GENERAL COUNSELING AND ADVICE FOR CONTRACEPTIVE MANAGEMENT: Primary | ICD-10-CM

## 2024-11-15 DIAGNOSIS — Z30.013 INITIATION OF DEPO PROVERA: ICD-10-CM

## 2024-11-15 RX ORDER — MEDROXYPROGESTERONE ACETATE 150 MG/ML
150 INJECTION, SUSPENSION INTRAMUSCULAR
Qty: 1 ML | Refills: 3 | Status: SHIPPED | OUTPATIENT
Start: 2024-11-15 | End: 2024-11-15

## 2024-11-15 RX ORDER — MEDROXYPROGESTERONE ACETATE 150 MG/ML
150 INJECTION, SUSPENSION INTRAMUSCULAR
Qty: 1 EACH | Refills: 3 | Status: SHIPPED | OUTPATIENT
Start: 2024-11-15

## 2024-11-15 ASSESSMENT — ENCOUNTER SYMPTOMS
SHORTNESS OF BREATH: 0
BACK PAIN: 0
GASTROINTESTINAL NEGATIVE: 1
DIARRHEA: 0
RESPIRATORY NEGATIVE: 1
RECTAL PAIN: 0
CHEST TIGHTNESS: 0
NAUSEA: 0
CONSTIPATION: 0
ABDOMINAL PAIN: 0

## 2024-11-15 NOTE — TELEPHONE ENCOUNTER
Patient called stating she is at the pharmacy and they have not received her birthcontrol script. Please resend .

## 2024-11-15 NOTE — PROGRESS NOTES
Pt presents today for initial contraception prescription. Pt would like to be on Depo-Provera. Pt got Nexplanon removed last month after having it for 3 years due to migraines, 3 week periods, and mood swings.   
fever and unexpected weight change.   Respiratory: Negative.  Negative for chest tightness and shortness of breath.    Cardiovascular: Negative.  Negative for chest pain.   Gastrointestinal: Negative.  Negative for abdominal pain, constipation, diarrhea, nausea and rectal pain.   Genitourinary:  Negative for difficulty urinating, dyspareunia, frequency, genital sores, menstrual problem, pelvic pain, vaginal discharge and vaginal pain.   Musculoskeletal: Negative.  Negative for back pain and gait problem.   Skin: Negative.    Neurological: Negative.  Negative for dizziness and headaches.   Psychiatric/Behavioral: Negative.  Negative for behavioral problems, self-injury and suicidal ideas. The patient is not nervous/anxious.        Physical Exam  Constitutional:       Appearance: Normal appearance. She is well-developed. She is not diaphoretic.   HENT:      Head: Normocephalic and atraumatic.      Nose: Nose normal.   Eyes:      Extraocular Movements: Extraocular movements intact.      Conjunctiva/sclera: Conjunctivae normal.      Pupils: Pupils are equal, round, and reactive to light.   Neck:      Thyroid: No thyromegaly.      Trachea: No tracheal deviation.   Pulmonary:      Effort: Pulmonary effort is normal. No respiratory distress.   Musculoskeletal:         General: Normal range of motion.      Cervical back: Normal range of motion and neck supple.      Comments: Normal ROM for upper and lower extremities. Gait steady.   Skin:     General: Skin is warm and dry.      Findings: No lesion or rash.   Neurological:      Mental Status: She is alert and oriented to person, place, and time.   Psychiatric:         Mood and Affect: Mood normal.         Speech: Speech normal.         Behavior: Behavior normal.         MEDICATIONS:  Orders Placed This Encounter   Medications    DISCONTD: medroxyPROGESTERone (DEPO-PROVERA) 150 MG/ML injection     Sig: Inject 1 mL into the muscle every 3 months     Dispense:  1 mL     Refill:

## 2025-07-06 NOTE — PROGRESS NOTES
Ms.Kaitlin Jolly is a 25 y.o. female who presents today for  Chief Complaint   Patient presents with    Established New Doctor     She stopped her birthcontrol a few months ago and having trouble losing weight.        HPI:  History of Present Illness  The patient presents to establish care and for evaluation of weight management, breast discharge, and irregular periods.  She was seen once in 2020.    She has been experiencing difficulty in losing weight, despite being on a strict diet and exercise regimen. Her diet, guided by a nutritional , primarily consists of proteins, two types of carbohydrates (amish rice and white potatoes), and multiple vegetables.  Her caloric intake is between 1200 to 1300 calories per day. She avoids red meat and consumes mostly fish, shrimp, and chicken breast. Her exercise routine includes cardio, strength training, jump rope, calisthenics, and HIIT workouts with dumbbells and bands.  She works out 2 consecutive days and off 1 throughout the week.  Despite these efforts, she has only lost six or seven pounds. She quit smoking and vaping three years ago and rarely drinks alcohol. She has also limited her caffeine intake.     She was previously on Nexplanon, which was replaced with Depo-Provera in November 2024 after its expiration. However, she experienced severe depression and weight gain up to 159 pounds while on Depo-Provera, leading her to discontinue its use in 02/2025.  Her last injection was in November.  Since then, her weight has decreased to 155 pounds, and her depression and anxiety have resolved.    She reports a milky discharge from her breasts when squeezed, which started several months ago.  She occasionally experiences unusual pains on both sides of her breasts but has not noticed any nodules or abnormalities. She has not had a mammogram before. She has not used any form of birth control since stopping Depo-Provera.  She also has some mild scaliness and

## 2025-07-07 ENCOUNTER — OFFICE VISIT (OUTPATIENT)
Dept: PRIMARY CARE CLINIC | Age: 25
End: 2025-07-07
Payer: COMMERCIAL

## 2025-07-07 VITALS
WEIGHT: 148 LBS | DIASTOLIC BLOOD PRESSURE: 60 MMHG | SYSTOLIC BLOOD PRESSURE: 110 MMHG | HEIGHT: 62 IN | TEMPERATURE: 98.1 F | HEART RATE: 82 BPM | OXYGEN SATURATION: 98 % | BODY MASS INDEX: 27.23 KG/M2

## 2025-07-07 DIAGNOSIS — Z51.81 MEDICATION MONITORING ENCOUNTER: ICD-10-CM

## 2025-07-07 DIAGNOSIS — R63.5 WEIGHT GAIN: ICD-10-CM

## 2025-07-07 DIAGNOSIS — Z76.89 ENCOUNTER TO ESTABLISH CARE: Primary | ICD-10-CM

## 2025-07-07 DIAGNOSIS — N92.6 IRREGULAR MENSES: ICD-10-CM

## 2025-07-07 DIAGNOSIS — Z13.220 ENCOUNTER FOR LIPID SCREENING FOR CARDIOVASCULAR DISEASE: ICD-10-CM

## 2025-07-07 DIAGNOSIS — Z13.6 ENCOUNTER FOR LIPID SCREENING FOR CARDIOVASCULAR DISEASE: ICD-10-CM

## 2025-07-07 DIAGNOSIS — N64.3 GALACTORRHEA: ICD-10-CM

## 2025-07-07 DIAGNOSIS — N63.0 BREAST NODULE: ICD-10-CM

## 2025-07-07 PROBLEM — R10.33 PERIUMBILICAL ABDOMINAL PAIN: Status: RESOLVED | Noted: 2020-11-03 | Resolved: 2025-07-07

## 2025-07-07 PROBLEM — E55.9 HYPOVITAMINOSIS D: Status: RESOLVED | Noted: 2020-10-28 | Resolved: 2025-07-07

## 2025-07-07 LAB
ALCOHOL URINE: NORMAL
AMPHETAMINE SCREEN URINE: NORMAL
BARBITURATE SCREEN URINE: NORMAL
BENZODIAZEPINE SCREEN, URINE: NORMAL
BUPRENORPHINE URINE: NORMAL
COCAINE METABOLITE SCREEN URINE: NORMAL
FENTANYL SCREEN, URINE: NORMAL
GABAPENTIN SCREEN, URINE: NORMAL
MDMA, URINE: NORMAL
METHADONE SCREEN, URINE: NORMAL
METHAMPHETAMINE, URINE: NORMAL
OPIATE SCREEN URINE: NORMAL
OXYCODONE SCREEN URINE: NORMAL
PHENCYCLIDINE SCREEN URINE: NORMAL
PROPOXYPHENE SCREEN, URINE: NORMAL
SYNTHETIC CANNABINOIDS(K2) SCREEN, URINE: NORMAL
THC SCREEN, URINE: NORMAL
TRAMADOL SCREEN URINE: NORMAL
TRICYCLIC ANTIDEPRESSANTS, UR: NORMAL

## 2025-07-07 PROCEDURE — 80305 DRUG TEST PRSMV DIR OPT OBS: CPT | Performed by: NURSE PRACTITIONER

## 2025-07-07 PROCEDURE — 99204 OFFICE O/P NEW MOD 45 MIN: CPT | Performed by: NURSE PRACTITIONER

## 2025-07-07 SDOH — ECONOMIC STABILITY: FOOD INSECURITY: WITHIN THE PAST 12 MONTHS, THE FOOD YOU BOUGHT JUST DIDN'T LAST AND YOU DIDN'T HAVE MONEY TO GET MORE.: NEVER TRUE

## 2025-07-07 SDOH — ECONOMIC STABILITY: FOOD INSECURITY: WITHIN THE PAST 12 MONTHS, YOU WORRIED THAT YOUR FOOD WOULD RUN OUT BEFORE YOU GOT MONEY TO BUY MORE.: NEVER TRUE

## 2025-07-07 ASSESSMENT — PATIENT HEALTH QUESTIONNAIRE - PHQ9
SUM OF ALL RESPONSES TO PHQ QUESTIONS 1-9: 3
SUM OF ALL RESPONSES TO PHQ QUESTIONS 1-9: 3
8. MOVING OR SPEAKING SO SLOWLY THAT OTHER PEOPLE COULD HAVE NOTICED. OR THE OPPOSITE, BEING SO FIGETY OR RESTLESS THAT YOU HAVE BEEN MOVING AROUND A LOT MORE THAN USUAL: NOT AT ALL
3. TROUBLE FALLING OR STAYING ASLEEP: NOT AT ALL
SUM OF ALL RESPONSES TO PHQ QUESTIONS 1-9: 3
5. POOR APPETITE OR OVEREATING: NOT AT ALL
SUM OF ALL RESPONSES TO PHQ QUESTIONS 1-9: 3
4. FEELING TIRED OR HAVING LITTLE ENERGY: NOT AT ALL
1. LITTLE INTEREST OR PLEASURE IN DOING THINGS: NOT AT ALL
7. TROUBLE CONCENTRATING ON THINGS, SUCH AS READING THE NEWSPAPER OR WATCHING TELEVISION: NEARLY EVERY DAY
2. FEELING DOWN, DEPRESSED OR HOPELESS: NOT AT ALL
9. THOUGHTS THAT YOU WOULD BE BETTER OFF DEAD, OR OF HURTING YOURSELF: NOT AT ALL
6. FEELING BAD ABOUT YOURSELF - OR THAT YOU ARE A FAILURE OR HAVE LET YOURSELF OR YOUR FAMILY DOWN: NOT AT ALL
10. IF YOU CHECKED OFF ANY PROBLEMS, HOW DIFFICULT HAVE THESE PROBLEMS MADE IT FOR YOU TO DO YOUR WORK, TAKE CARE OF THINGS AT HOME, OR GET ALONG WITH OTHER PEOPLE: SOMEWHAT DIFFICULT

## 2025-07-08 DIAGNOSIS — Z13.220 ENCOUNTER FOR LIPID SCREENING FOR CARDIOVASCULAR DISEASE: ICD-10-CM

## 2025-07-08 DIAGNOSIS — Z13.6 ENCOUNTER FOR LIPID SCREENING FOR CARDIOVASCULAR DISEASE: ICD-10-CM

## 2025-07-08 DIAGNOSIS — R63.5 WEIGHT GAIN: ICD-10-CM

## 2025-07-08 DIAGNOSIS — N64.3 GALACTORRHEA: ICD-10-CM

## 2025-07-08 LAB
ALBUMIN SERPL-MCNC: 4.2 G/DL (ref 3.5–5.2)
ALP SERPL-CCNC: 72 U/L (ref 35–104)
ALT SERPL-CCNC: 17 U/L (ref 10–35)
ANION GAP SERPL CALCULATED.3IONS-SCNC: 9 MMOL/L (ref 8–16)
AST SERPL-CCNC: 23 U/L (ref 10–35)
BASOPHILS # BLD: 0.1 K/UL (ref 0–0.2)
BASOPHILS NFR BLD: 0.9 % (ref 0–1)
BILIRUB SERPL-MCNC: 0.3 MG/DL (ref 0.2–1.2)
BUN SERPL-MCNC: 11 MG/DL (ref 6–20)
CALCIUM SERPL-MCNC: 9.2 MG/DL (ref 8.6–10)
CHLORIDE SERPL-SCNC: 104 MMOL/L (ref 98–107)
CHOLEST SERPL-MCNC: 170 MG/DL (ref 0–199)
CO2 SERPL-SCNC: 24 MMOL/L (ref 22–29)
CORTIS AM PEAK SERPL-MCNC: 17 UG/DL (ref 4.8–19.5)
CREAT SERPL-MCNC: 0.7 MG/DL (ref 0.5–0.9)
EOSINOPHIL # BLD: 0.2 K/UL (ref 0–0.6)
EOSINOPHIL NFR BLD: 3 % (ref 0–5)
ERYTHROCYTE [DISTWIDTH] IN BLOOD BY AUTOMATED COUNT: 13 % (ref 11.5–14.5)
ESTRADIOL SERPL-SCNC: 54.4 PG/ML
FSH SERPL-SCNC: 6.2 MIU/ML
GLUCOSE SERPL-MCNC: 95 MG/DL (ref 70–99)
HCG SERPL QL: NEGATIVE
HCT VFR BLD AUTO: 40.9 % (ref 37–47)
HDLC SERPL-MCNC: 45 MG/DL (ref 40–60)
HGB BLD-MCNC: 13.8 G/DL (ref 12–16)
IMM GRANULOCYTES # BLD: 0 K/UL
INSULIN SERPL-ACNC: 4.2 UIU/ML (ref 2.6–24.9)
LDLC SERPL CALC-MCNC: 110 MG/DL
LH SERPL-ACNC: 5.4 MIU/ML
LYMPHOCYTES # BLD: 2.7 K/UL (ref 1.1–4.5)
LYMPHOCYTES NFR BLD: 47.4 % (ref 20–40)
MCH RBC QN AUTO: 29.7 PG (ref 27–31)
MCHC RBC AUTO-ENTMCNC: 33.7 G/DL (ref 33–37)
MCV RBC AUTO: 88.1 FL (ref 81–99)
MONOCYTES # BLD: 0.4 K/UL (ref 0–0.9)
MONOCYTES NFR BLD: 6.4 % (ref 0–10)
NEUTROPHILS # BLD: 2.4 K/UL (ref 1.5–7.5)
NEUTS SEG NFR BLD: 42 % (ref 50–65)
PLATELET # BLD AUTO: 258 K/UL (ref 130–400)
PMV BLD AUTO: 12.6 FL (ref 9.4–12.3)
POTASSIUM SERPL-SCNC: 4.6 MMOL/L (ref 3.5–5.1)
PROGEST SERPL-MCNC: 0.65 NG/ML
PROLACTIN SERPL-MCNC: 28.7 NG/ML (ref 4.79–23.3)
PROT SERPL-MCNC: 6.8 G/DL (ref 6.4–8.3)
RBC # BLD AUTO: 4.64 M/UL (ref 4.2–5.4)
SODIUM SERPL-SCNC: 137 MMOL/L (ref 136–145)
T4 FREE SERPL-MCNC: 1.17 NG/DL (ref 0.93–1.7)
TESTOST SERPL-MCNC: 29.6 NG/DL (ref 8.4–48.1)
TRIGL SERPL-MCNC: 76 MG/DL (ref 0–149)
TSH SERPL DL<=0.005 MIU/L-ACNC: 4.54 UIU/ML (ref 0.27–4.2)
WBC # BLD AUTO: 5.8 K/UL (ref 4.8–10.8)

## 2025-07-10 ENCOUNTER — TELEPHONE (OUTPATIENT)
Dept: PRIMARY CARE CLINIC | Age: 25
End: 2025-07-10

## 2025-07-10 ENCOUNTER — HOSPITAL ENCOUNTER (OUTPATIENT)
Dept: MRI IMAGING | Age: 25
Discharge: HOME | End: 2025-07-10
Payer: COMMERCIAL

## 2025-07-10 DIAGNOSIS — E22.1 HYPERPROLACTINEMIA: Primary | ICD-10-CM

## 2025-07-10 DIAGNOSIS — D35.2 PITUITARY ADENOMA (HCC): Primary | ICD-10-CM

## 2025-07-10 DIAGNOSIS — D35.2 PITUITARY ADENOMA (HCC): ICD-10-CM

## 2025-07-10 DIAGNOSIS — E22.1 HYPERPROLACTINEMIA: ICD-10-CM

## 2025-07-10 PROCEDURE — A9577 INJ MULTIHANCE: HCPCS | Performed by: NURSE PRACTITIONER

## 2025-07-10 PROCEDURE — 70553 MRI BRAIN STEM W/O & W/DYE: CPT | Performed by: NURSE PRACTITIONER

## 2025-07-10 PROCEDURE — 6360000004 HC RX CONTRAST MEDICATION: Performed by: NURSE PRACTITIONER

## 2025-07-10 RX ADMIN — GADOBENATE DIMEGLUMINE 13 ML: 529 INJECTION, SOLUTION INTRAVENOUS at 08:42

## 2025-07-10 NOTE — TELEPHONE ENCOUNTER
Patient was notified of changing the location of the referral and the additional labs that the provider ordered. She will fast for them and have them done.

## 2025-07-10 NOTE — TELEPHONE ENCOUNTER
The new referral was sent to [facility] on 07-. I sent the referral,office note, MRI report, Labs, insurance card and demographics to  [phone]. They will have the doctor to review and schedule the apt.    Patient will fast for the additional test. Then I will fax these as well to the above fax #.

## 2025-07-10 NOTE — TELEPHONE ENCOUNTER
I got off the phone with [facility] spoke with Brianna and they will not move her up from the 10- apt. Patient has been placed on the waiting list for sooner apt.

## 2025-07-10 NOTE — TELEPHONE ENCOUNTER
I called [facility] at [phone] today , they are requesting documents be faxed to [phone] to review.     I informed them that it is a urgent referral so we can get this on the books sooner.    Do you want me to create the referral or do you want to do it? If you create it please put Urgent on the referral.

## 2025-07-10 NOTE — TELEPHONE ENCOUNTER
Can we see if  endocrinology can get her in sooner?  Sorry, just would like her seen sooner than Oct.  Diagnosis - hyperprolactinemia, pituitary adenoma

## 2025-07-10 NOTE — TELEPHONE ENCOUNTER
Yes, please see if we can get this moved up.  She now has an additional diagnosis of pituitary adenoma.    I have messaged her explaining the results and advised that we are trying to move up her appt.    I am going to refer to neurosurgery as well for opinion on the findings.

## 2025-07-10 NOTE — TELEPHONE ENCOUNTER
I sent the MRI Pituitary W WO contrast report that was completed on 07- to [facility] on 07- to [phone] to add with other documents I sent on 07-.    Do you want me to try and schedule for a sooner apt?

## 2025-07-11 ENCOUNTER — TELEPHONE (OUTPATIENT)
Dept: NEUROSURGERY | Age: 25
End: 2025-07-11

## 2025-07-11 DIAGNOSIS — D35.2 PITUITARY ADENOMA (HCC): ICD-10-CM

## 2025-07-11 DIAGNOSIS — D35.2 PITUITARY ADENOMA (HCC): Primary | ICD-10-CM

## 2025-07-11 DIAGNOSIS — E22.1 HYPERPROLACTINEMIA: ICD-10-CM

## 2025-07-11 NOTE — TELEPHONE ENCOUNTER
Since she will be seeing [facility], we can try to refer there.  I have looked on the [facility] website and Dr. Adal Connell with neurosurgery specializes in pituitary adenomas and skull base disorders.  When I try to put neurosurgery in the referral order, it only allows me to put neurology.

## 2025-07-11 NOTE — TELEPHONE ENCOUNTER
[facility] Neurosurgery cannot treat Pituitary adenoma. Patient will need to be referred to higher level tertiary care, such as [facility]. Opal Tillman states that patient needs a skull based neurosurgeon and believes that Syed Oconnor would be the one to refer to.

## 2025-07-14 DIAGNOSIS — D35.2 PITUITARY ADENOMA (HCC): ICD-10-CM

## 2025-07-14 DIAGNOSIS — E22.1 HYPERPROLACTINEMIA: ICD-10-CM

## 2025-07-14 LAB — ACTH PLAS-MCNC: 14.9 PG/ML (ref 7.2–63.3)

## 2025-07-15 LAB
IGF-I SERPL-MCNC: 244 NG/ML (ref 96–301)
IGF-I Z-SCORE SERPL: 1

## 2025-07-16 ENCOUNTER — HOSPITAL ENCOUNTER (OUTPATIENT)
Dept: WOMENS IMAGING | Age: 25
Discharge: HOME | End: 2025-07-16
Payer: COMMERCIAL

## 2025-07-16 DIAGNOSIS — N64.3 GALACTORRHEA: ICD-10-CM

## 2025-07-16 DIAGNOSIS — N63.0 BREAST NODULE: ICD-10-CM

## 2025-07-16 PROCEDURE — 76642 ULTRASOUND BREAST LIMITED: CPT

## 2025-07-17 LAB
COLLECT DURATION TIME SPEC: 24 H
CORTIS F 24H UR HPLC-MCNC: 2.45 UG/L
CORTIS F 24H UR-MRATE: 8.1 UG/D
CORTIS F/CREAT 24H UR: 6.81 UG/G CRT
CREAT 24H UR-MCNC: 36 MG/DL
CREAT 24H UR-MRATE: 1188 MG/D (ref 700–1600)
IMP & REVIEW OF LAB RESULTS: NORMAL
SPECIMEN VOL ?TM UR: 3300 ML